# Patient Record
Sex: MALE | Race: WHITE | NOT HISPANIC OR LATINO
[De-identification: names, ages, dates, MRNs, and addresses within clinical notes are randomized per-mention and may not be internally consistent; named-entity substitution may affect disease eponyms.]

---

## 2024-08-15 PROBLEM — Z00.00 ENCOUNTER FOR PREVENTIVE HEALTH EXAMINATION: Status: ACTIVE | Noted: 2024-08-15

## 2024-08-19 ENCOUNTER — APPOINTMENT (OUTPATIENT)
Dept: ORTHOPEDIC SURGERY | Facility: CLINIC | Age: 44
End: 2024-08-19
Payer: COMMERCIAL

## 2024-08-19 VITALS
BODY MASS INDEX: 27.08 KG/M2 | HEIGHT: 74 IN | DIASTOLIC BLOOD PRESSURE: 100 MMHG | WEIGHT: 211 LBS | OXYGEN SATURATION: 97 % | SYSTOLIC BLOOD PRESSURE: 146 MMHG | HEART RATE: 65 BPM

## 2024-08-19 VITALS
DIASTOLIC BLOOD PRESSURE: 100 MMHG | WEIGHT: 211 LBS | OXYGEN SATURATION: 97 % | HEIGHT: 74 IN | BODY MASS INDEX: 27.08 KG/M2 | HEART RATE: 65 BPM | SYSTOLIC BLOOD PRESSURE: 146 MMHG

## 2024-08-19 VITALS — DIASTOLIC BLOOD PRESSURE: 96 MMHG | SYSTOLIC BLOOD PRESSURE: 141 MMHG

## 2024-08-19 DIAGNOSIS — M72.2 PLANTAR FASCIAL FIBROMATOSIS: ICD-10-CM

## 2024-08-19 DIAGNOSIS — M79.606 PAIN IN LEG, UNSPECIFIED: ICD-10-CM

## 2024-08-19 PROCEDURE — 99204 OFFICE O/P NEW MOD 45 MIN: CPT

## 2024-08-19 PROCEDURE — 73590 X-RAY EXAM OF LOWER LEG: CPT | Mod: RT

## 2024-08-19 RX ORDER — CELECOXIB 200 MG/1
200 CAPSULE ORAL TWICE DAILY
Qty: 60 | Refills: 4 | Status: ACTIVE | COMMUNITY
Start: 2024-08-19 | End: 1900-01-01

## 2024-08-19 NOTE — HISTORY OF PRESENT ILLNESS
[FreeTextEntry1] : Walt is a 43-year-old male him seen today for the evaluation of his right foot and calf.  He has a significant history of plantar fasciitis for which she was treated by Dr. Ashley He was given a plantar injection which helped immensely after about 4 days.  He feels like the majority of his symptoms have resolved.  He is left with some mild discomfort associated with his calf and wanted to be checked out for that.  I explained that the to go very much hand-in-hand and that the stretching he will need to do in order to try to prevent recurrence of the plantar fasciitis will involve stretching his calf both gastroc and soleus heads.  We talked about some other conservative things that he could do at home inclusive of freezing some water bottles and using it to roll the plantar surface of his foot over the bottle not only has a help and stretching but also help to ice.  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  He asked me further for recommendations associated with the gym as far as returning to gym and I talked him about the only exercise to avoid being leg extensions due to the forces across the patellofemoral joint.

## 2024-08-19 NOTE — ASSESSMENT
[FreeTextEntry1] : Impression plantar fasciitis right leg resolving with some soft tissue discomfort associated with gastrocsoleus, construct complex suggest continued stretching conservative management as he returns to the gym he might benefit from more routine use of anti-inflammatories so sent him a new prescription for Celebrex and for him to try.  Also explained again certain things to avoid suggested crosstraining shoewear stretching partial foam roller for use at home Zensa compression sleeves for his calf and several other conservative options.  He is happy with the visit today.  At the end of visit we did briefly have a discussion about his prior diagnosis of right hip labral pathology which she says bothers him off and on.  I asked certainly explained to him that specialized care is appropriate and necessary in my opinion for appropriate management of the labrum.  I explained that I am more than happy to assist him in the future if it is problematic as he returns to the gym certainly could consider repeat imaging is necessary with MRI scanning likely with an arthrogram.  Please excuse any errors in this note as it was completed with Dragon software.

## 2024-08-19 NOTE — PHYSICAL EXAM
[de-identified] : Physical exam shows healthy appearing younger than stated age male no acute distress good motion associate with both heels and actually quite flexible gastroc and soleus construct associate with both legs.  Palpation the posterior aspect of the right leg does not demonstrate any significant swelling tenderness muscular or soft tissue irregularities no masses.  No other abnormalities detected.  Neurovascularly intact.  Alignment is normal.  Musculature in the lower extremities is well-maintained. [de-identified] : 2 view tib-fib right leg screening x-ray to rule out occult pathology negative for bony irregularities masses fractures calcifications or other changes from normal.

## 2025-04-16 ENCOUNTER — APPOINTMENT (OUTPATIENT)
Dept: OTOLARYNGOLOGY | Facility: CLINIC | Age: 45
End: 2025-04-16
Payer: COMMERCIAL

## 2025-04-16 VITALS
DIASTOLIC BLOOD PRESSURE: 87 MMHG | WEIGHT: 211 LBS | OXYGEN SATURATION: 98 % | RESPIRATION RATE: 16 BRPM | BODY MASS INDEX: 27.08 KG/M2 | SYSTOLIC BLOOD PRESSURE: 132 MMHG | HEART RATE: 70 BPM | HEIGHT: 74 IN

## 2025-04-16 DIAGNOSIS — H61.032 CHONDRITIS OF LEFT EXTERNAL EAR: ICD-10-CM

## 2025-04-16 PROCEDURE — 99203 OFFICE O/P NEW LOW 30 MIN: CPT
